# Patient Record
Sex: MALE
[De-identification: names, ages, dates, MRNs, and addresses within clinical notes are randomized per-mention and may not be internally consistent; named-entity substitution may affect disease eponyms.]

---

## 2024-10-02 ENCOUNTER — NON-APPOINTMENT (OUTPATIENT)
Age: 29
End: 2024-10-02

## 2024-10-03 ENCOUNTER — APPOINTMENT (OUTPATIENT)
Dept: DERMATOLOGY | Facility: CLINIC | Age: 29
End: 2024-10-03
Payer: MEDICAID

## 2024-10-03 VITALS — WEIGHT: 195 LBS | BODY MASS INDEX: 25.03 KG/M2 | HEIGHT: 74 IN

## 2024-10-03 DIAGNOSIS — D22.9 MELANOCYTIC NEVI, UNSPECIFIED: ICD-10-CM

## 2024-10-03 DIAGNOSIS — D48.9 NEOPLASM OF UNCERTAIN BEHAVIOR, UNSPECIFIED: ICD-10-CM

## 2024-10-03 PROBLEM — Z00.00 ENCOUNTER FOR PREVENTIVE HEALTH EXAMINATION: Status: ACTIVE | Noted: 2024-10-03

## 2024-10-03 PROCEDURE — 99203 OFFICE O/P NEW LOW 30 MIN: CPT | Mod: 25

## 2024-10-03 PROCEDURE — 11102 TANGNTL BX SKIN SINGLE LES: CPT

## 2024-10-03 RX ORDER — CETIRIZINE HYDROCHLORIDE 10 MG/1
TABLET, FILM COATED ORAL
Refills: 0 | Status: ACTIVE | COMMUNITY

## 2024-10-03 NOTE — HISTORY OF PRESENT ILLNESS
[FreeTextEntry1] : NPV- moles [de-identified] : Galo Gallego 30 y/o M presents for a focused exam on upper body.   Has moles on his chest, abd and back he would like checked Mentions a larger one on his right upper back that he has concerns about due to its size. Not sure if changing.  Personal history of skin cancer: No Family history of skin cancer: No History of blistering sunburns: Hx of sunburns but not blistering History of tanning bed use: Yes Uses sunscreen regularly: Yes

## 2024-10-03 NOTE — HISTORY OF PRESENT ILLNESS
[FreeTextEntry1] : NPV- moles [de-identified] : Galo Gallego 28 y/o M presents for a focused exam on upper body.   Has moles on his chest, abd and back he would like checked Mentions a larger one on his right upper back that he has concerns about due to its size. Not sure if changing.  Personal history of skin cancer: No Family history of skin cancer: No History of blistering sunburns: Hx of sunburns but not blistering History of tanning bed use: Yes Uses sunscreen regularly: Yes

## 2024-10-03 NOTE — ASSESSMENT
[FreeTextEntry1] : #Multiple benign appearing melanocytic nevi Patient was reassured of the benign nature of these findings. No further treatment needed at this time. If any lesion changes or becomes symptomatic I recommend follow-up  # Neoplasm of Uncertain Behavior Location: right upper back Differential Diagnosis: nevus, DN, melanoma Recommendation: skin biopsy (removal) by shave technique   Clinical photographs were recommended in order to document the appearance and location of skin lesion/s.  Verbal consent obtained from the patient to proceed with photography. These photos will remain in the patient's electronic health record.   Biopsy by Shave Technique- Procedure Note Verbal consent was obtained and a time out was performed. The patient was counseled about the risk of bleeding, scar, and infection. The area was cleaned with an alcohol swab. Anesthesia: 1% lidocaine with 1:100,000 epinephrine buffered with sodium bicarbonate Procedure: Biopsy by shave technique The specimen was sent for pathologic examination. Hemostasis: aluminum chloride A bandage was placed and wound care was reviewed with the patient.  RTC prn for FBSE
[FreeTextEntry1] : #Multiple benign appearing melanocytic nevi Patient was reassured of the benign nature of these findings. No further treatment needed at this time. If any lesion changes or becomes symptomatic I recommend follow-up  # Neoplasm of Uncertain Behavior Location: right upper back Differential Diagnosis: nevus, DN, melanoma Recommendation: skin biopsy (removal) by shave technique   Clinical photographs were recommended in order to document the appearance and location of skin lesion/s.  Verbal consent obtained from the patient to proceed with photography. These photos will remain in the patient's electronic health record.   Biopsy by Shave Technique- Procedure Note Verbal consent was obtained and a time out was performed. The patient was counseled about the risk of bleeding, scar, and infection. The area was cleaned with an alcohol swab. Anesthesia: 1% lidocaine with 1:100,000 epinephrine buffered with sodium bicarbonate Procedure: Biopsy by shave technique The specimen was sent for pathologic examination. Hemostasis: aluminum chloride A bandage was placed and wound care was reviewed with the patient.  RTC prn for FBSE
Attending Attestation (For Attendings USE Only)...

## 2024-10-03 NOTE — PHYSICAL EXAM
[Face] : Face [Chest] : Chest [Abdomen] : Abdomen [Back] : Back [R Arm] : R Arm [L Arm] : L Arm [FreeTextEntry3] : On the trunk and upper extremities bilaterally, are multiple scattered tan to brown macules and papules with regular borders. Some of these were examined dermoscopically and had reassuring features.  right upper back: 1.2cm oval smooth brown 2-toned papule

## 2024-10-24 ENCOUNTER — APPOINTMENT (OUTPATIENT)
Dept: DERMATOLOGY | Facility: CLINIC | Age: 29
End: 2024-10-24
Payer: MEDICAID

## 2024-10-24 VITALS — WEIGHT: 195 LBS | BODY MASS INDEX: 25.04 KG/M2

## 2024-10-24 PROCEDURE — 99213 OFFICE O/P EST LOW 20 MIN: CPT

## 2024-10-29 ENCOUNTER — APPOINTMENT (OUTPATIENT)
Dept: DERMATOLOGY | Facility: CLINIC | Age: 29
End: 2024-10-29
Payer: MEDICAID

## 2024-10-29 DIAGNOSIS — D23.5 OTHER BENIGN NEOPLASM OF SKIN OF TRUNK: ICD-10-CM

## 2024-10-29 DIAGNOSIS — D48.9 NEOPLASM OF UNCERTAIN BEHAVIOR, UNSPECIFIED: ICD-10-CM

## 2024-10-29 DIAGNOSIS — D48.5 NEOPLASM OF UNCERTAIN BEHAVIOR OF SKIN: ICD-10-CM

## 2024-10-29 PROCEDURE — 11602 EXC TR-EXT MAL+MARG 1.1-2 CM: CPT | Mod: 59

## 2024-10-29 PROCEDURE — 12032 INTMD RPR S/A/T/EXT 2.6-7.5: CPT | Mod: 59

## 2024-10-29 PROCEDURE — 11104 PUNCH BX SKIN SINGLE LESION: CPT | Mod: 59

## 2024-10-29 PROCEDURE — 11105 PUNCH BX SKIN EA SEP/ADDL: CPT | Mod: 59

## 2024-11-06 LAB — DERMATOLOGY BIOPSY: NORMAL

## 2024-11-07 DIAGNOSIS — R23.8 OTHER SKIN CHANGES: ICD-10-CM

## 2024-11-07 RX ORDER — MUPIROCIN 20 MG/G
2 OINTMENT TOPICAL
Qty: 1 | Refills: 0 | Status: ACTIVE | COMMUNITY
Start: 2024-11-07 | End: 1900-01-01